# Patient Record
Sex: FEMALE | Race: WHITE | HISPANIC OR LATINO | URBAN - METROPOLITAN AREA
[De-identification: names, ages, dates, MRNs, and addresses within clinical notes are randomized per-mention and may not be internally consistent; named-entity substitution may affect disease eponyms.]

---

## 2017-02-18 ENCOUNTER — EMERGENCY (EMERGENCY)
Facility: HOSPITAL | Age: 42
LOS: 1 days | Discharge: HOME | End: 2017-02-18
Attending: EMERGENCY MEDICINE | Admitting: EMERGENCY MEDICINE
Payer: COMMERCIAL

## 2017-02-18 VITALS
RESPIRATION RATE: 18 BRPM | OXYGEN SATURATION: 98 % | DIASTOLIC BLOOD PRESSURE: 79 MMHG | HEART RATE: 92 BPM | TEMPERATURE: 99 F | SYSTOLIC BLOOD PRESSURE: 115 MMHG

## 2017-02-18 VITALS
OXYGEN SATURATION: 98 % | RESPIRATION RATE: 20 BRPM | DIASTOLIC BLOOD PRESSURE: 81 MMHG | TEMPERATURE: 98 F | SYSTOLIC BLOOD PRESSURE: 119 MMHG | HEART RATE: 75 BPM

## 2017-02-18 DIAGNOSIS — R07.0 PAIN IN THROAT: ICD-10-CM

## 2017-02-18 PROCEDURE — 99282 EMERGENCY DEPT VISIT SF MDM: CPT

## 2017-02-18 NOTE — ED ADULT NURSE NOTE - OBJECTIVE STATEMENT
42 yo female hx HTN, DM2 c/o sore throat. states she was advised to come to ED by urgent care today for "possible fungal infection." states rapid strep was negative at urgent care. no fevers, no N/V/D. denies CP, SOB, urinary symptoms, no recent travel, no known sick contacts. VSS here, well appearing, some redness noted to oropharynx, exudate to the posterior throat. family at the bedside, evaluated by MD, pending plan.

## 2017-02-18 NOTE — ED PROVIDER NOTE - MEDICAL DECISION MAKING DETAILS
40 yo F w/ family sent to ED for evaluation of sore throat, pt has 3 days of nasal congestion, mild bilateral ear pain, no fevers, clear chest, (-)Strep today at urgent care, sent to ED to "rule out more serious" process, very low suspicion for more serious/underlying at this time, DM managed well w/ nml blood sugars, in depth d/w pt/family about ddx, tx, renee, norm.  - Bharat Millan MD   ---------------------------------------------------------------------------------------

## 2017-02-18 NOTE — ED PROVIDER NOTE - PHYSICAL EXAMINATION
Well Appearing, Nontoxic, NAD;  Symm Facies, PERRL 3mm, (-)Pallor, Anicteric, MMM w/ erythema, scant exudate, no thrush, symm post OP, uvula midline, nml phonation;  No stridor;  RRR w/o m/g/r;   CTAB w/o w/r/r;   ANormal speech, normal strength/sensation/gait

## 2018-03-23 NOTE — ED ADULT NURSE NOTE - NS ED PATIENT SAFETY CONCERN
Pt presents with elevated Bp, pt states she has been seen for this before and has been treated for anxiety   No

## 2020-01-28 ENCOUNTER — EMERGENCY (EMERGENCY)
Facility: HOSPITAL | Age: 45
LOS: 1 days | Discharge: HOME | End: 2020-01-28
Attending: EMERGENCY MEDICINE
Payer: COMMERCIAL

## 2020-01-28 VITALS
RESPIRATION RATE: 18 BRPM | DIASTOLIC BLOOD PRESSURE: 84 MMHG | WEIGHT: 190.04 LBS | HEART RATE: 91 BPM | SYSTOLIC BLOOD PRESSURE: 144 MMHG | OXYGEN SATURATION: 99 % | TEMPERATURE: 99 F | HEIGHT: 61 IN

## 2020-01-28 VITALS
HEART RATE: 87 BPM | TEMPERATURE: 99 F | OXYGEN SATURATION: 97 % | DIASTOLIC BLOOD PRESSURE: 86 MMHG | RESPIRATION RATE: 16 BRPM | SYSTOLIC BLOOD PRESSURE: 127 MMHG

## 2020-01-28 PROBLEM — I10 ESSENTIAL (PRIMARY) HYPERTENSION: Chronic | Status: ACTIVE | Noted: 2017-02-18

## 2020-01-28 PROBLEM — E11.9 TYPE 2 DIABETES MELLITUS WITHOUT COMPLICATIONS: Chronic | Status: ACTIVE | Noted: 2017-02-18

## 2020-01-28 PROCEDURE — 99283 EMERGENCY DEPT VISIT LOW MDM: CPT

## 2020-01-28 RX ORDER — ACETAMINOPHEN 500 MG
650 TABLET ORAL ONCE
Refills: 0 | Status: COMPLETED | OUTPATIENT
Start: 2020-01-28 | End: 2020-01-28

## 2020-01-28 RX ORDER — LIDOCAINE 4 G/100G
10 CREAM TOPICAL ONCE
Refills: 0 | Status: COMPLETED | OUTPATIENT
Start: 2020-01-28 | End: 2020-01-28

## 2020-01-28 RX ORDER — FAMOTIDINE 10 MG/ML
20 INJECTION INTRAVENOUS DAILY
Refills: 0 | Status: DISCONTINUED | OUTPATIENT
Start: 2020-01-28 | End: 2020-02-03

## 2020-01-28 RX ADMIN — Medication 30 MILLILITER(S): at 11:22

## 2020-01-28 RX ADMIN — LIDOCAINE 10 MILLILITER(S): 4 CREAM TOPICAL at 11:22

## 2020-01-28 RX ADMIN — Medication 650 MILLIGRAM(S): at 12:21

## 2020-01-28 RX ADMIN — Medication 650 MILLIGRAM(S): at 11:23

## 2020-01-28 NOTE — ED PROVIDER NOTE - CLINICAL SUMMARY MEDICAL DECISION MAKING FREE TEXT BOX
Jam MASSEY MD PGY2: Patient here with foreign body sensation that has been improving without concern for actual foreign body and GERD like sxs. Likely GERD, no emergenct signs or sxs at this time. Will give GERD cocktail to ensure patient can tolerate PO and likely d/c. Jam MASSEY MD PGY2: Patient here with foreign body sensation that has been improving without concern for actual foreign body and GERD like sxs. Likely GERD, no emergenct signs or sxs at this time. Will give GERD cocktail to ensure patient can tolerate PO and likely d/c.   Sara: 44 year old female with fb sensation that has been improving since this am.  tolerating PO solids and liquid afterwards.  possible gerd. soft dinner last night. takes diabetes meds and felt like it was stuck. will give gi cocktail, po challenge, d/c home to f/u outpatient.

## 2020-01-28 NOTE — ED PROVIDER NOTE - PATIENT PORTAL LINK FT
You can access the FollowMyHealth Patient Portal offered by [facility] by registering at the following website: [email or web address]. By joining [facility]’s FollowMyHealth portal, you will also be able to view your health information using other applications (apps) compatible with our system.

## 2020-01-28 NOTE — ED PROVIDER NOTE - OBJECTIVE STATEMENT
Jam MASSEY MD PGY2: 44 F PMH T2DM here for foreign body sensation after waking up in the am today. Takes metformin every morning, not evening. Sensation has been improving, has been tolerating own secretions and has had no difficulty breathing. Also has been having URI sxs over the past few days, endorsing some post nasal drip. No fever, chills, N/V/CP/SOB. Endorses GERD like sxs and burning in epigastrium.

## 2020-01-28 NOTE — ED ADULT NURSE NOTE - OBJECTIVE STATEMENT
Patient is a 43 y/o female c/o feeling of something stuck in throat. States she woke up out at sleep at 5am this morning w feeling of something stuck in throat. Denies having feeling before going to bed, denies trouble swallowing. States she has been sick over past few days with productive cough with green sputum. Takes metformin for DM. Denies CP, SOB, N/V/D, numbness tingling, cough, fever, chills, dizziness, weakness, headache.

## 2020-01-28 NOTE — ED PROVIDER NOTE - PHYSICAL EXAMINATION
Jam MASSEY MD PGY2:   PHYSICAL EXAM:    GENERAL: NAD, well-developed. Pharynx patent and clear, no tonsillar erythema.   HEENT:  Atraumatic, Normocephalic  CHEST/LUNG: Chest rise equal bilaterally. CTAB.   HEART: Regular rate and rhythm. No murmurs or rubs.   ABDOMEN: Soft, Nontender, Nondistended  EXTREMITIES:  2+ Peripheral Pulses.  PSYCH: A&Ox3  SKIN: No obvious rashes or lesions

## 2020-01-28 NOTE — ED PROVIDER NOTE - NSFOLLOWUPINSTRUCTIONS_ED_ALL_ED_FT
Please return to the ED for any concerns. Please follow-up with your primary care doctor in the next 1 - 3 days.      You may take pepcid over the coutner to see if it helps with your symptoms. If you develop difficulty breathing or you're unable to swallow your own saliva, please return.

## 2021-01-06 NOTE — ED ADULT NURSE NOTE - RESPIRATORY WDL
Patient was coughing too much and her  brought her to ED  She was having sob, EKG showed atrial flutter, QTc prolongation  Her son passed away on 12/21/2020  She did have swelling in her legs increased over last week  She has swelling in the past, treated with lasix with improvement in June, 2020  Patient was evaluated by Cardiology  Cardiac echo with large pericardial effusion without tamponade, EF 65%  Currently off of Cardizem drip, on p o  Metoprolol  Heart rate is well controlled  Patient continues to be on heparin drip    Will switch over to oral anticoagulation when cleared by Cardiology  Has a PICC line due to difficult IV access Breathing spontaneous and unlabored. Breath sounds clear and equal bilaterally with regular rhythm.

## 2021-02-19 NOTE — ED PROVIDER NOTE - NS ED MD DISPO DISCHARGE
Home Patient's first and last name, , procedure, and correct site confirmed prior to the start of procedure.

## 2021-10-05 ENCOUNTER — TRANSCRIPTION ENCOUNTER (OUTPATIENT)
Age: 46
End: 2021-10-05

## 2022-08-03 ENCOUNTER — NON-APPOINTMENT (OUTPATIENT)
Age: 47
End: 2022-08-03

## 2024-05-22 NOTE — ED ADULT TRIAGE NOTE - CADM TRG TX PRIOR TO ARRIVAL
none Spray Paint Text: The liquid nitrogen was applied to the skin utilizing a spray paint frosting technique. Add 52 Modifier (Optional): no Duration Of Freeze Thaw-Cycle (Seconds): 3 Detail Level: Detailed Show Applicator Variable?: Yes Application Tool (Optional): Forceps Consent: The patient's consent was obtained including but not limited to risks of crusting, scabbing, blistering, scarring, darker or lighter pigmentary change, recurrence, incomplete removal and infection. Medical Necessity Clause: This procedure was medically necessary because the lesions that were treated were: Number Of Freeze-Thaw Cycles: 3 freeze-thaw cycles Post-Care Instructions: I reviewed with the patient in detail post-care instructions. Patient is to wear sunprotection, and avoid picking at any of the treated lesions. Pt may apply Vaseline to crusted or scabbing areas. Medical Necessity Information: It is in your best interest to select a reason for this procedure from the list below. All of these items fulfill various CMS LCD requirements except the new and changing color options. Number Of Freeze-Thaw Cycles: 2 freeze-thaw cycles

## 2024-11-05 NOTE — ED PROVIDER NOTE - TOBACCO USE
[de-identified] : 38-year-old female had a hardware removed from her right wrist.  She says the wrist is actually feeling better.  It feels stronger than it has in the past.  She is having some pain and discomfort in the middle finger.  She says it bothers her every day.  No locking associated with it.  She has a discomfort in the finger. Never smoker

## 2024-11-18 ENCOUNTER — NON-APPOINTMENT (OUTPATIENT)
Age: 49
End: 2024-11-18